# Patient Record
Sex: FEMALE | Race: WHITE | URBAN - METROPOLITAN AREA
[De-identification: names, ages, dates, MRNs, and addresses within clinical notes are randomized per-mention and may not be internally consistent; named-entity substitution may affect disease eponyms.]

---

## 2020-06-15 ENCOUNTER — OFFICE VISIT (OUTPATIENT)
Dept: URBAN - METROPOLITAN AREA CLINIC 19 | Facility: CLINIC | Age: 80
End: 2020-06-15

## 2020-07-10 ENCOUNTER — OFFICE VISIT (OUTPATIENT)
Dept: URBAN - METROPOLITAN AREA CLINIC 19 | Facility: CLINIC | Age: 80
End: 2020-07-10
Payer: COMMERCIAL

## 2020-07-10 ENCOUNTER — DASHBOARD ENCOUNTERS (OUTPATIENT)
Age: 80
End: 2020-07-10

## 2020-07-10 DIAGNOSIS — C94.6 MYELODYSPLASTIC DISEASE: ICD-10-CM

## 2020-07-10 DIAGNOSIS — K92.0 BLOODY EMESIS: ICD-10-CM

## 2020-07-10 DIAGNOSIS — K92.1 BLACK STOOL: ICD-10-CM

## 2020-07-10 DIAGNOSIS — J44.9 CHRONIC OBSTRUCTIVE PULMONARY DISEASE, UNSPECIFIED COPD TYPE: ICD-10-CM

## 2020-07-10 PROBLEM — 13645005: Status: ACTIVE | Noted: 2020-07-10

## 2020-07-10 PROBLEM — 74474003: Status: ACTIVE | Noted: 2020-07-10

## 2020-07-10 PROBLEM — 109995007: Status: ACTIVE | Noted: 2020-07-10

## 2020-07-10 PROBLEM — 59614000: Status: ACTIVE | Noted: 2020-07-10

## 2020-07-10 PROBLEM — 305058001: Status: ACTIVE | Noted: 2020-07-10

## 2020-07-10 PROCEDURE — 1036F TOBACCO NON-USER: CPT | Performed by: INTERNAL MEDICINE

## 2020-07-10 PROCEDURE — G8427 DOCREV CUR MEDS BY ELIG CLIN: HCPCS | Performed by: INTERNAL MEDICINE

## 2020-07-10 PROCEDURE — G9903 PT SCRN TBCO ID AS NON USER: HCPCS | Performed by: INTERNAL MEDICINE

## 2020-07-10 PROCEDURE — 99203 OFFICE O/P NEW LOW 30 MIN: CPT | Performed by: INTERNAL MEDICINE

## 2020-07-10 PROCEDURE — G8420 CALC BMI NORM PARAMETERS: HCPCS | Performed by: INTERNAL MEDICINE

## 2020-07-10 RX ORDER — SODIUM, POTASSIUM,MAG SULFATES 17.5-3.13G
254 ML SOLUTION, RECONSTITUTED, ORAL ORAL ONCE
Qty: 1 KIT | Refills: 0 | OUTPATIENT
Start: 2020-07-10 | End: 2020-07-11

## 2020-07-10 NOTE — HPI-TODAY'S VISIT:
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 Harpreet Dos Santos. The patient has hemoccult positive stool, and she has required blood transfusions occasionally. She had her last colonoscopy in 2015 - results not available. She has coronary artery disease, and she has had 3 stents placed. On a baby aspirin. Sees Dr. Charles Jensen.

## 2020-12-22 ENCOUNTER — LAB OUTSIDE AN ENCOUNTER (OUTPATIENT)
Dept: URBAN - METROPOLITAN AREA CLINIC 111 | Facility: CLINIC | Age: 80
End: 2020-12-22

## 2020-12-22 ENCOUNTER — OUT OF OFFICE VISIT (OUTPATIENT)
Dept: URBAN - METROPOLITAN AREA MEDICAL CENTER 25 | Facility: MEDICAL CENTER | Age: 80
End: 2020-12-22
Payer: COMMERCIAL

## 2020-12-22 DIAGNOSIS — J44.9 ASTHMATIC BRONCHITIS , CHRONIC: ICD-10-CM

## 2020-12-22 DIAGNOSIS — D50.9 ANEMIA, IRON DEFICIENCY: ICD-10-CM

## 2020-12-22 DIAGNOSIS — R93.3 ABN FINDINGS-GI TRACT: ICD-10-CM

## 2020-12-22 DIAGNOSIS — K62.5 ANAL BLEEDING: ICD-10-CM

## 2020-12-22 DIAGNOSIS — R74.8 ABNORMAL ALKALINE PHOSPHATASE TEST: ICD-10-CM

## 2020-12-22 PROCEDURE — 99222 1ST HOSP IP/OBS MODERATE 55: CPT | Performed by: INTERNAL MEDICINE

## 2020-12-22 PROCEDURE — G8427 DOCREV CUR MEDS BY ELIG CLIN: HCPCS | Performed by: INTERNAL MEDICINE

## 2020-12-22 PROCEDURE — 99233 SBSQ HOSP IP/OBS HIGH 50: CPT | Performed by: INTERNAL MEDICINE

## 2020-12-23 ENCOUNTER — LAB OUTSIDE AN ENCOUNTER (OUTPATIENT)
Dept: URBAN - METROPOLITAN AREA CLINIC 111 | Facility: CLINIC | Age: 80
End: 2020-12-23

## 2020-12-23 LAB
IMMATURE RETIC FRACTION: 33.5
INR: 1.8
LDH: 974
Lab: <8
PERFORMING LAB: (no result)
PT: 21.6
RETIC ABSOLUTE: 150.7
RETIC HEMOGLOBIN: 36.3
RETICULOCYTE %: 6.76

## 2020-12-24 ENCOUNTER — LAB OUTSIDE AN ENCOUNTER (OUTPATIENT)
Dept: URBAN - METROPOLITAN AREA CLINIC 111 | Facility: CLINIC | Age: 80
End: 2020-12-24

## 2020-12-24 LAB
AG RATIO: 1
ALBUMIN LEVEL: 2.2
ALK PHOS: 259
ALT: 17
ANION GAP: 6
AST: 50
BILIRUBIN TOTAL: 4.9
BUN/CREAT RATIO: 24
BUN: 19
CALCIUM LEVEL: 7.4
CHLORIDE LEVEL: 103
CO2 LEVEL: 29
CREATININE LEVEL: 0.8
GFR AFRICAN AMERICAN: >60
GFR NON AFRICAN AMERICAN: >60
GLUCOSE LEVEL: 91
OSMO (CALC): 278
PERFORMING LAB: (no result)
POTASSIUM LEVEL: 4.4
PROTEIN TOTAL: 5.5
SODIUM LEVEL: 138

## 2020-12-25 ENCOUNTER — OUT OF OFFICE VISIT (OUTPATIENT)
Dept: URBAN - METROPOLITAN AREA MEDICAL CENTER 25 | Facility: MEDICAL CENTER | Age: 80
End: 2020-12-25
Payer: COMMERCIAL

## 2020-12-25 DIAGNOSIS — K62.5 ANAL BLEEDING: ICD-10-CM

## 2020-12-25 DIAGNOSIS — R74.8 ABNORMAL ALKALINE PHOSPHATASE TEST: ICD-10-CM

## 2020-12-25 DIAGNOSIS — D62 ACUTE BLOOD LOSS ANEMIA: ICD-10-CM

## 2020-12-25 DIAGNOSIS — R93.3 ABN FINDINGS-GI TRACT: ICD-10-CM

## 2020-12-25 PROCEDURE — 99233 SBSQ HOSP IP/OBS HIGH 50: CPT | Performed by: INTERNAL MEDICINE

## 2020-12-28 ENCOUNTER — OUT OF OFFICE VISIT (OUTPATIENT)
Dept: URBAN - METROPOLITAN AREA MEDICAL CENTER 25 | Facility: MEDICAL CENTER | Age: 80
End: 2020-12-28
Payer: COMMERCIAL

## 2020-12-28 DIAGNOSIS — K62.5 ANAL BLEEDING: ICD-10-CM

## 2020-12-28 DIAGNOSIS — K74.60 ADVANCED CIRRHOSIS OF LIVER: ICD-10-CM

## 2020-12-28 DIAGNOSIS — R93.3 ABN FINDINGS-GI TRACT: ICD-10-CM

## 2020-12-28 PROCEDURE — 99233 SBSQ HOSP IP/OBS HIGH 50: CPT | Performed by: INTERNAL MEDICINE

## 2020-12-28 PROCEDURE — 99232 SBSQ HOSP IP/OBS MODERATE 35: CPT | Performed by: INTERNAL MEDICINE

## 2020-12-29 ENCOUNTER — OUT OF OFFICE VISIT (OUTPATIENT)
Dept: URBAN - METROPOLITAN AREA MEDICAL CENTER 25 | Facility: MEDICAL CENTER | Age: 80
End: 2020-12-29
Payer: COMMERCIAL

## 2020-12-29 DIAGNOSIS — K74.60 ADVANCED CIRRHOSIS OF LIVER: ICD-10-CM

## 2020-12-29 PROCEDURE — 43235 EGD DIAGNOSTIC BRUSH WASH: CPT | Performed by: INTERNAL MEDICINE

## 2022-10-05 ENCOUNTER — OUT OF OFFICE VISIT (OUTPATIENT)
Dept: URBAN - METROPOLITAN AREA MEDICAL CENTER 25 | Facility: MEDICAL CENTER | Age: 82
End: 2022-10-05
Payer: COMMERCIAL

## 2022-10-05 DIAGNOSIS — D69.6 THROMBOCYTOPENIA: ICD-10-CM

## 2022-10-05 DIAGNOSIS — J44.9 ADVANCED CHRONIC OBSTRUCTIVE PULMONARY DISEASE: ICD-10-CM

## 2022-10-05 DIAGNOSIS — K62.5 ANAL BLEEDING: ICD-10-CM

## 2022-10-05 PROCEDURE — G8427 DOCREV CUR MEDS BY ELIG CLIN: HCPCS | Performed by: PHYSICIAN ASSISTANT

## 2022-10-05 PROCEDURE — 99223 1ST HOSP IP/OBS HIGH 75: CPT | Performed by: PHYSICIAN ASSISTANT

## 2022-10-05 PROCEDURE — 99233 SBSQ HOSP IP/OBS HIGH 50: CPT | Performed by: PHYSICIAN ASSISTANT

## 2022-11-11 PROBLEM — 302215000 THROMBOCYTOPENIA: Status: ACTIVE | Noted: 2022-11-11
